# Patient Record
Sex: FEMALE | Race: WHITE | Employment: OTHER | URBAN - METROPOLITAN AREA
[De-identification: names, ages, dates, MRNs, and addresses within clinical notes are randomized per-mention and may not be internally consistent; named-entity substitution may affect disease eponyms.]

---

## 2019-02-23 ENCOUNTER — HOSPITAL ENCOUNTER (INPATIENT)
Age: 44
LOS: 1 days | Discharge: HOME | DRG: 898 | End: 2019-02-24
Attending: PSYCHIATRY & NEUROLOGY | Admitting: PSYCHIATRY & NEUROLOGY
Payer: OTHER GOVERNMENT

## 2019-02-23 PROCEDURE — 65220000003 HC RM SEMIPRIVATE PSYCH

## 2019-02-23 RX ORDER — HALOPERIDOL 5 MG/ML
5 INJECTION INTRAMUSCULAR
Status: DISCONTINUED | OUTPATIENT
Start: 2019-02-23 | End: 2019-02-24 | Stop reason: HOSPADM

## 2019-02-23 RX ORDER — LOSARTAN POTASSIUM 50 MG/1
50 TABLET ORAL DAILY
Status: DISCONTINUED | OUTPATIENT
Start: 2019-02-24 | End: 2019-02-24 | Stop reason: HOSPADM

## 2019-02-23 RX ORDER — LANOLIN ALCOHOL/MO/W.PET/CERES
100 CREAM (GRAM) TOPICAL DAILY
Status: DISCONTINUED | OUTPATIENT
Start: 2019-02-24 | End: 2019-02-24 | Stop reason: HOSPADM

## 2019-02-23 RX ORDER — FOLIC ACID 1 MG/1
1 TABLET ORAL DAILY
Status: DISCONTINUED | OUTPATIENT
Start: 2019-02-24 | End: 2019-02-24 | Stop reason: HOSPADM

## 2019-02-23 RX ORDER — HALOPERIDOL 5 MG/1
5 TABLET ORAL
Status: DISCONTINUED | OUTPATIENT
Start: 2019-02-23 | End: 2019-02-24 | Stop reason: HOSPADM

## 2019-02-23 RX ORDER — PANTOPRAZOLE SODIUM 40 MG/1
40 TABLET, DELAYED RELEASE ORAL
Status: DISCONTINUED | OUTPATIENT
Start: 2019-02-24 | End: 2019-02-24 | Stop reason: HOSPADM

## 2019-02-23 RX ORDER — LORAZEPAM 1 MG/1
1 TABLET ORAL
Status: DISCONTINUED | OUTPATIENT
Start: 2019-02-23 | End: 2019-02-24 | Stop reason: HOSPADM

## 2019-02-23 RX ORDER — HYDROXYZINE PAMOATE 50 MG/1
50 CAPSULE ORAL
Status: DISCONTINUED | OUTPATIENT
Start: 2019-02-23 | End: 2019-02-24 | Stop reason: HOSPADM

## 2019-02-23 RX ORDER — LORAZEPAM 1 MG/1
2 TABLET ORAL
Status: DISCONTINUED | OUTPATIENT
Start: 2019-02-23 | End: 2019-02-24 | Stop reason: HOSPADM

## 2019-02-23 RX ORDER — TRAZODONE HYDROCHLORIDE 50 MG/1
50 TABLET ORAL
Status: DISCONTINUED | OUTPATIENT
Start: 2019-02-23 | End: 2019-02-24 | Stop reason: HOSPADM

## 2019-02-23 RX ORDER — ACETAMINOPHEN 325 MG/1
650 TABLET ORAL
Status: DISCONTINUED | OUTPATIENT
Start: 2019-02-23 | End: 2019-02-24 | Stop reason: HOSPADM

## 2019-02-24 VITALS
SYSTOLIC BLOOD PRESSURE: 151 MMHG | TEMPERATURE: 97.8 F | DIASTOLIC BLOOD PRESSURE: 97 MMHG | HEART RATE: 92 BPM | RESPIRATION RATE: 20 BRPM

## 2019-02-24 PROBLEM — F32.A DEPRESSION: Status: ACTIVE | Noted: 2019-02-24

## 2019-02-24 PROBLEM — F10.20 ALCOHOL USE DISORDER, SEVERE, DEPENDENCE (HCC): Chronic | Status: ACTIVE | Noted: 2019-02-24

## 2019-02-24 PROCEDURE — 74011250637 HC RX REV CODE- 250/637: Performed by: PSYCHIATRY & NEUROLOGY

## 2019-02-24 RX ADMIN — PANTOPRAZOLE SODIUM 40 MG: 40 TABLET, DELAYED RELEASE ORAL at 06:39

## 2019-02-24 RX ADMIN — FOLIC ACID 1 MG: 1 TABLET ORAL at 08:45

## 2019-02-24 RX ADMIN — LORAZEPAM 1 MG: 1 TABLET ORAL at 00:12

## 2019-02-24 RX ADMIN — LORAZEPAM 1 MG: 1 TABLET ORAL at 06:22

## 2019-02-24 RX ADMIN — Medication 100 MG: at 08:45

## 2019-02-24 RX ADMIN — LORAZEPAM 1 MG: 1 TABLET ORAL at 09:22

## 2019-02-24 RX ADMIN — LOSARTAN POTASSIUM 50 MG: 50 TABLET ORAL at 08:45

## 2019-02-24 RX ADMIN — Medication 2 CAPSULE: at 06:39

## 2019-02-24 NOTE — DISCHARGE INSTRUCTIONS
BEHAVIORAL HEALTH NURSING DISCHARGE NOTE      The following personal items collected during your admission are returned to you:   Dental Appliance: Dental Appliances: None  Vision: Visual Aid: None  Hearing Aid:    Jewelry: Jewelry: None  Clothing: Clothing: Footwear, Pants, Shirt, Socks, Sweater  Other Valuables: Other Valuables: Other (comment)(make up bag in locker behind nurse station)  Valuables sent to safe: Personal Items Sent to Safe: (16 credit and debit cards)      PATIENT INSTRUCTIONS:    Your health and safety is very important to us; we remind you to commit to safety and recovery. Should you have any thoughts of harming yourself or others please dial 911, utilize your support systems, the coping strategies you have learned as well as the [facility] at [phone] or visit their website at https://suicidepreventionlifeline.org/    The following are some additional coping strategies that you can utilize if you start to feel anxious, angry, stressed or depressed    1. Exercise (running, walking, etc.). 2. Write (poetry, stories, journal). 3. Be with other people. 4. Watch a favorite TV show. 5. Practice Mindfulness  6. Watch a funny/favorite movie  7. Do some deep breathing  8. Take a hot shower or relaxing bath. 9. Play with a pet. 10. Help others  11. Read a good book. 12. Listen to music. 13. Try some aromatherapy (candle, lotion, room spray). 14. Meditate. 15. Paint or draw. 16. Rip paper into itty-bitty pieces. 17. Shoot hoops, kick a ball. 18. Hug a pillow or stuffed animal.  19. Dance. 20. Take up a new hobby. 21. [place]. 25. Make a list of blessings in your life. 23. Contact a hotline/ your therapist.  24. Talk to someone close to you. 25. Yoga. 32. Jo Ann Ramirez a friend or family member. 32. Make a list of goals for the week/month/year/5 years. The discharge information has been reviewed with the patient.   The patient verbalized understanding.       Patient armband removed and shredded      ***IMPORTANT NUMBERS***        [address]        [phone] [address]       [phone]    Suicide Prevention     [phone]

## 2019-02-24 NOTE — BH NOTES
GROUP THERAPY PROGRESS NOTE Swetha Spence is participating in [place]. Group time: 20 minutes Personal goal for participation: Positive Behavior Goal orientation: Making Positive Goals Group therapy participation: passive Therapeutic interventions reviewed and discussed: Pt encouraged to take part in treatment and to participate in activites and verbalize any issues with doctor or staff. Impression of participation: Pt sat in group but was quiet and voiced no concerns.

## 2019-02-24 NOTE — BH NOTES
0012 patient received ativan 1 mg PO for CIWA score of 5. Patient polite and appropriate with staff. 
0176 CIWA score 6. Patient received ativan 1 mg PO.

## 2019-02-25 NOTE — DISCHARGE SUMMARY
[address] SUMMARY    Name:  Ang Mendez  MR#:   [id number]  :  1975  ACCOUNT #:  [de-identified]  ADMIT DATE:  2019  DISCHARGE DATE:  2019    SHORT-STAY SUMMARY    IDENTIFYING DATA:  The patient is a 19-year-old  white female, resident of [place], [place] who lives with her  and three children. The older step child is out of the house. She is covered by [employer]. BASIS FOR ADMISSION:  The patient was admitted in referral to us from [facility] where the patient had presented in an intoxicated condition. The doctor's note shows that she had presented and supposedly was saying that she wanted inpatient psychiatric treatment. Laboratory testing done in the emergency room showed her to have a normal CBC, normal urinalysis other than a small amount of protein 100 mg/dL and moderate blood in the urine. The chemistry electrolyte panel showed slight elevation of AST 64 U/L with a slight decrease in calcium of 8.2 mg/dL and the remainder normal.  Urine hCG was negative. The alcohol level showed her to be grossly intoxicated at 289 mg/dL on the alcohol level. Urine drug screen was negative. A chest x-ray had been done because of her complaint of chest discomfort, which was normal.  An x-ray was done of the forearm, which was again normal.  A 12-lead EKG showed sinus tachycardia with nonspecific T-wave abnormality, which was improved in the anterolateral leads compared to a prior EKG. Physical examination had been done and was normal with her being described as well developed and well nourished. She did have a small tender bump over the right frontal region of the hairline without an overlying skin change or deformity. There are no other abnormalities other than this. She had no limitation in range of motion of the neck.   She had some right upper extremity tenderness to the distal radial and mid ulna without overlying skin change, deformity or crepitus with full active range of motion of wrist and elbow and as noted above, the x-ray was normal.  Neurologic examination was normal and she was answering questions appropriately with normal stance and gait moving all extremities well, though tearful and anxious. The doctor's note says that she was asking for inpatient care. When the patient was interviewed, she says that she saw the mental health screener who told her that they would give her information regarding both inpatient and outpatient treatment and she did not know that she was being sent specifically for inpatient detoxification to this facility. The patient describes a history of alcoholism which started after her gastric bypass. She denied any preexisting treatment. She had been treated several years ago at [facility] with 3 days of inpatient detoxification and then a 30-day day program.  She had been seeing a provider at [facility]. She says that she was being prescribed naltrexone 50 mg at night as needed if she felt that she was going to relapse, as well as Campral 333 mg t.i.d. She denied psychiatric medicines though there was noted that at one point in the past she had been on trazodone at night for sleep. She was being prescribed gabapentin up to 2000 mg a day for severe essential tremor. She was now denying any withdrawal symptoms. She denied desire to be in an inpatient facility and felt she did not need this. She wanted to be attending a day treatment program.  She was denying hallucinatory or delusional material.  She denied homicidal or suicidal ideas. PAST MEDICAL HISTORY:  Medical history is significant for gastric bypass having been up to a high of 270 pounds and then dropping down to 160 pounds currently. She has had low B12 level since this time and does get monthly B12 shots.   She has a history of peripartum cardiomyopathy. She has hypertension and the essential tremor. She has had bronchitis recently and said that she had back pain at that time in which she came to the hospital, but it seems to be related to her bronchitis. SUBSTANCE ABUSE:  Alcohol history is as above. She says she has had concerns of a relapse at times and will take the naltrexone as needed. She denied drug abuse. She was smoking 15 cigarettes a day and did want to take Chantix to stop this when she got out of the hospital.    FAMILY AND SOCIAL HISTORY:  She denied a family history of psychiatric illness or drug or alcohol abuse. She had previously been in the [employer]. She and her  had child-rearing responsibilities for his three older children, now 21,19, and 16 with the children's mother having left the area. She also has an 6year-old child. She does work as a . MENTAL STATUS EXAM:  Revealed her to be an alert, oriented white female with an essential tremor. Eye contact was fair. Speech was fluent. Mood was unhappy with a congruent affect. Thought processing was logical and goal directed. She was denying hallucinatory or delusional material.  Memory and cognition were intact. Insight and judgment were influenced by her substance use and unhappiness with her current situation. She was minimizing or discounting all symptoms of withdrawal.    HOSPITAL COURSE:  The patient was admitted to the [facility] where she was afforded the individual, group, and milieu therapies. While in the hospital, she had received dosing of 2 capsules of Raffy-Recover capsules, folic acid 1 mg, lorazepam 1 mg on three occasions for anxiety, losartan 50 mg, Protonix 40 mg, and thiamine 100 mg. As noted, she was saying she did not wish to be in the hospital and was not feeling that she had withdrawal symptoms.   She felt that she needed to be discharged so that she could seek out her treatment that she really wanted, which [facility].  She did not wish to stay in the hospital for an additional day for our social workers to work on this tomorrow to get her there. She did not meet criteria to hold her against medical advice. It did not appear medically indicated to force her to stay for medical treatment. ASSESSMENT  AXIS I:  1. Alcohol use disorder, severe alcohol intoxication, resolved. 2.  Nicotine use disorder, moderate. 3.  Alcohol-induced mood disorder. AXIS II:  None. AXIS III:  1. Essential tremor. 2.  Cardiomyopathy, improved. 3.  Status post gastric bypass. 4.  Vitamin B12 deficiency. 5.  Hypertension. 6.  Recent bronchitis. CONDITION ON DISCHARGE:  Fair. PROGNOSIS:  Fair. DISPOSITION:  Discharged to self. The patient [facility] to try to get into their day program for alcohol treatment. The patient will follow up with [facility] program to see her therapist and the provider there. DISCHARGE MEDICATIONS:  1.  Gabapentin 400 mg q.i.d.  2.  Naltrexone 50 mg at bedtime as needed to maintain sobriety. 3.  Campral 333 mg three times a day. 4.  Vitamin B12 injections monthly. The patient does not require prescriptions as she has medications at home.       Krishna Tello MD      [id number]  D:  02/24/2019 12:22  T:  02/24/2019 19:34  JOB #:  [id number]

## 2019-08-10 PROBLEM — R07.9 CHEST PAIN: Status: ACTIVE | Noted: 2019-08-10

## 2019-08-10 PROBLEM — N12 PYELONEPHRITIS: Status: ACTIVE | Noted: 2019-08-10

## 2019-11-14 PROBLEM — T50.901A OVERDOSE: Status: ACTIVE | Noted: 2019-11-14

## 2022-02-02 DIAGNOSIS — K91.2 POST-RESECTION MALABSORPTION: Primary | ICD-10-CM

## 2022-02-09 ENCOUNTER — HOSPITAL ENCOUNTER (OUTPATIENT)
Dept: LAB | Age: 47
Discharge: HOME | End: 2022-02-09
Payer: OTHER GOVERNMENT

## 2022-02-09 DIAGNOSIS — K91.2 POST-RESECTION MALABSORPTION: ICD-10-CM

## 2022-02-09 LAB
25(OH)D3 SERPL-MCNC: 23.8 NG/ML (ref 30–100)
ALBUMIN SERPL-MCNC: 3.7 G/DL (ref 3.4–5)
ALBUMIN/GLOB SERPL: 1.3 {RATIO} (ref 0.8–1.7)
ALP SERPL-CCNC: 71 U/L (ref 45–117)
ALT SERPL-CCNC: 29 U/L (ref 13–56)
ANION GAP SERPL CALC-SCNC: 5 MMOL/L (ref 3–18)
AST SERPL-CCNC: 18 U/L (ref 10–38)
BASOPHILS # BLD: 0.1 K/UL (ref 0–0.1)
BASOPHILS NFR BLD: 1 % (ref 0–2)
BILIRUB SERPL-MCNC: 0.4 MG/DL (ref 0.2–1)
BUN SERPL-MCNC: 9 MG/DL (ref 7–18)
BUN/CREAT SERPL: 16 (ref 12–20)
CALCIUM SERPL-MCNC: 8.9 MG/DL (ref 8.5–10.1)
CHLORIDE SERPL-SCNC: 109 MMOL/L (ref 100–111)
CO2 SERPL-SCNC: 27 MMOL/L (ref 21–32)
CREAT SERPL-MCNC: 0.56 MG/DL (ref 0.6–1.3)
DIFFERENTIAL METHOD BLD: ABNORMAL
EOSINOPHIL # BLD: 0.1 K/UL (ref 0–0.4)
EOSINOPHIL NFR BLD: 1 % (ref 0–5)
ERYTHROCYTE [DISTWIDTH] IN BLOOD BY AUTOMATED COUNT: 13.9 % (ref 11.6–14.5)
FERRITIN SERPL-MCNC: 20 NG/ML (ref 8–388)
FOLATE SERPL-MCNC: 11.6 NG/ML (ref 3.1–17.5)
GLOBULIN SER CALC-MCNC: 2.9 G/DL (ref 2–4)
GLUCOSE SERPL-MCNC: 61 MG/DL (ref 74–99)
HCT VFR BLD AUTO: 40 % (ref 35–45)
HGB BLD-MCNC: 13.1 G/DL (ref 12–16)
IMM GRANULOCYTES # BLD AUTO: 0.2 K/UL (ref 0–0.04)
IMM GRANULOCYTES NFR BLD AUTO: 2 % (ref 0–0.5)
IRON SERPL-MCNC: 52 UG/DL (ref 50–175)
LYMPHOCYTES # BLD: 2.9 K/UL (ref 0.9–3.6)
LYMPHOCYTES NFR BLD: 30 % (ref 21–52)
MCH RBC QN AUTO: 31.6 PG (ref 24–34)
MCHC RBC AUTO-ENTMCNC: 32.8 G/DL (ref 31–37)
MCV RBC AUTO: 96.4 FL (ref 78–100)
MONOCYTES # BLD: 0.7 K/UL (ref 0.05–1.2)
MONOCYTES NFR BLD: 7 % (ref 3–10)
NEUTS SEG # BLD: 5.8 K/UL (ref 1.8–8)
NEUTS SEG NFR BLD: 60 % (ref 40–73)
NRBC # BLD: 0 K/UL (ref 0–0.01)
NRBC BLD-RTO: 0 PER 100 WBC
PLATELET # BLD AUTO: 401 K/UL (ref 135–420)
PMV BLD AUTO: 9.6 FL (ref 9.2–11.8)
POTASSIUM SERPL-SCNC: 4.1 MMOL/L (ref 3.5–5.5)
PROT SERPL-MCNC: 6.6 G/DL (ref 6.4–8.2)
RBC # BLD AUTO: 4.15 M/UL (ref 4.2–5.3)
SODIUM SERPL-SCNC: 141 MMOL/L (ref 136–145)
VIT B12 SERPL-MCNC: 570 PG/ML (ref 211–911)
WBC # BLD AUTO: 9.7 K/UL (ref 4.6–13.2)

## 2022-02-09 PROCEDURE — 84425 ASSAY OF VITAMIN B-1: CPT

## 2022-02-09 PROCEDURE — 82607 VITAMIN B-12: CPT

## 2022-02-09 PROCEDURE — 36415 COLL VENOUS BLD VENIPUNCTURE: CPT

## 2022-02-09 PROCEDURE — 85025 COMPLETE CBC W/AUTO DIFF WBC: CPT

## 2022-02-09 PROCEDURE — 82306 VITAMIN D 25 HYDROXY: CPT

## 2022-02-09 PROCEDURE — 80053 COMPREHEN METABOLIC PANEL: CPT

## 2022-02-09 PROCEDURE — 83540 ASSAY OF IRON: CPT

## 2022-02-09 PROCEDURE — 82728 ASSAY OF FERRITIN: CPT

## 2022-02-15 ENCOUNTER — OFFICE VISIT (OUTPATIENT)
Dept: SURGERY | Age: 47
End: 2022-02-15
Payer: OTHER GOVERNMENT

## 2022-02-15 VITALS
DIASTOLIC BLOOD PRESSURE: 85 MMHG | WEIGHT: 125 LBS | BODY MASS INDEX: 19.62 KG/M2 | HEIGHT: 67 IN | OXYGEN SATURATION: 100 % | TEMPERATURE: 96.8 F | HEART RATE: 65 BPM | SYSTOLIC BLOOD PRESSURE: 135 MMHG

## 2022-02-15 DIAGNOSIS — Z98.84 S/P GASTRIC BYPASS: ICD-10-CM

## 2022-02-15 DIAGNOSIS — K91.2 POST-RESECTION MALABSORPTION: Primary | ICD-10-CM

## 2022-02-15 DIAGNOSIS — R53.82 CHRONIC FATIGUE: ICD-10-CM

## 2022-02-15 DIAGNOSIS — R63.4 WEIGHT LOSS: ICD-10-CM

## 2022-02-15 DIAGNOSIS — R10.13 EPIGASTRIC PAIN: ICD-10-CM

## 2022-02-15 DIAGNOSIS — Z86.39 HX OF OBESITY: ICD-10-CM

## 2022-02-15 PROCEDURE — 99203 OFFICE O/P NEW LOW 30 MIN: CPT | Performed by: NURSE PRACTITIONER

## 2022-02-15 RX ORDER — PANTOPRAZOLE SODIUM 40 MG/1
TABLET, DELAYED RELEASE ORAL
COMMUNITY

## 2022-02-15 RX ORDER — DEXTROAMPHETAMINE SACCHARATE, AMPHETAMINE ASPARTATE, DEXTROAMPHETAMINE SULFATE AND AMPHETAMINE SULFATE 5; 5; 5; 5 MG/1; MG/1; MG/1; MG/1
20 TABLET ORAL
COMMUNITY

## 2022-02-15 NOTE — LETTER
2/17/2022    Patient: Marcella Agarwal   YOB: 1975   Date of Visit: 2/15/2022     Sailaja Emanuel NP  [address]  [address] [facility] [address]  Via Fax: [phone]    Dear Sailaja Emanuel NP,      Thank you for referring Ms. Yasmin Peña to [place] [address] for evaluation. My notes for this consultation are attached. If you have questions, please do not hesitate to call me. I look forward to following your patient along with you.       Sincerely,    Abraham Funez NP

## 2022-02-15 NOTE — PROGRESS NOTES
Bariatric Postoperative Progress Note    CC: Annual Bariatric Follow Up    Wendy Herrera is a 55 y.o. female now 12 years status post laparoscopic gastric bypass surgery performed in 2009 with Dr. Sophy Mazariegos. Currently eating chicken, seafood, beef, pork, turkey, broccoli, asparagus, brussels, apples, berries, oranges, bread, pasta, rice, potatoes, cookie, donuts, occ soda. Taking in 64 oz water,  120 g protein. 0 min of activity 7 days a week. Bowel movements are regular. She is compliant with multivitamins, calcium, Vit D and B12 supplements. Pre op weight: 275  Weight yamilka 145 in 2010, maintained 165-175 until March of last year. Had a lot of life changes, stopped ETOH, new job and went down to 145 in August.   Around that same time, started experiencing abd pain in epigastric area that will radiate to left shoulder and down arm as well as across to right side of back. She reports this pain is continuous but worsens with eating. Also experiencing fatigue and SOB, with further weight loss and is now 125 lbs. Pt reports this pain is similar in nature to when she had ulcer shortly after bypass, currently taking PPI and Carafate. PCP started workup due to rapid weight loss and fatigue. Pt had echo (hx cardiomyopathy), CT, and lab workup with recommendation to follow up with bariatrics. Currently smoking 1/2 ppd, sober from ETOH for 1 year, denies NSAIDs.      Weight Loss Metrics 2/15/2022 11/15/2019 8/10/2019 2/23/2019 1/16/2019 10/9/2017 3/8/2016   Pre op / Initial Wt - - - - - - -   Today's Wt 125 lb 152 lb 1.9 oz 156 lb 160 lb 0.9 oz 160 lb 175 lb 170 lb   BMI 19.58 kg/m2 21.22 kg/m2 25.18 kg/m2 25.07 kg/m2 25.06 kg/m2 28.25 kg/m2 27.45 kg/m2   Ideal Body Wt - - - - - - -   Excess Body Wt - - - - - - -   Goal Wt - - - - - - -   Wt loss to date - - - - - - -   % Wt Loss - - - - - - -   80% EBW - - - - - - -       Past Medical History:   Diagnosis Date    Anxiety     Arthropathy     weight-related    Essential tremor     GERD (gastroesophageal reflux disease)     Heart failure     Hepatic steatosis     Hypertension     Obesity, morbid ([address] Jan 2010    Clinically severe obesity with BMI of 46       Past Surgical History:   Procedure Laterality Date    HX BREAST AUGMENTATION  2010    HX GASTRIC BYPASS  1/18/2010    lap Lynette-en-Y    HX LIPECTOMY  2010       Current Outpatient Medications   Medication Sig Dispense Refill    pantoprazole (PROTONIX) 40 mg tablet pantoprazole 40 mg tablet,delayed release      dextroamphetamine-amphetamine (AdderalL) 20 mg tablet Take 20 mg by mouth.  albuterol (PROAIR HFA) 90 mcg/actuation inhaler Take 2 Puffs by inhalation every four (4) hours as needed for Wheezing.  fluticasone propionate (FLONASE) 50 mcg/actuation nasal spray 2 Sprays by Both Nostrils route daily.  ergocalciferol (ERGOCALCIFEROL) 50,000 unit capsule Take 1 Cap by mouth every seven (7) days. 12 Cap 3    propranolol (INDERAL) 20 mg tablet Take 1 tablet by mouth as needed 2 to 3 times a day.  cyanocobalamin (VITAMIN B12) 1,000 mcg/mL injection 1,000 mcg by IntraMUSCular route every month.  CARAFATE 100 mg/mL suspension Take 10 mL by mouth four (4) times daily. (Patient taking differently: Take 1 g by mouth four (4) times daily as needed. Indications: ulcer of the duodenum) 1200 mL 1    MULTIVITAMIN PO Take 1 Tab by mouth daily. Indications: lack in vitamins         Allergies   Allergen Reactions    Lisinopril Cough    Nsaids (Non-Steroidal Anti-Inflammatory Drug) Other (comments)     Had gastric bypass       ROS:  Review of Systems   Constitutional: Positive for malaise/fatigue and weight loss. Eyes: Negative. Respiratory: Positive for shortness of breath. Negative for cough. Cardiovascular: Positive for palpitations. Gastrointestinal: Positive for abdominal pain. Negative for constipation, diarrhea, heartburn, nausea and vomiting. Genitourinary: Negative.     Skin: Negative. Neurological: Positive for dizziness. Physicial Exam:  Visit Vitals  /85 (BP 1 Location: Right arm, BP Patient Position: Sitting)   Pulse 65   Temp 96.8 °F (36 °C)   Ht 5' 7\" (1.702 m)   Wt 56.7 kg (125 lb)   SpO2 100%   BMI 19.58 kg/m²     Physical Exam  Vitals and nursing note reviewed. Constitutional:       Appearance: Normal appearance. HENT:      Head: Normocephalic and atraumatic. Cardiovascular:      Rate and Rhythm: Normal rate and regular rhythm. Heart sounds: Normal heart sounds. Pulmonary:      Effort: Pulmonary effort is normal.   Abdominal:      General: Bowel sounds are normal. There is no distension. Palpations: Abdomen is soft. There is no mass. Tenderness: There is abdominal tenderness. Hernia: No hernia is present. Musculoskeletal:         General: Normal range of motion. Skin:     General: Skin is warm and dry. Neurological:      General: No focal deficit present. Mental Status: She is alert and oriented to person, place, and time. Psychiatric:         Mood and Affect: Mood normal.         Behavior: Behavior normal.         Labs:   Recent Results (from the past 672 hour(s))   CBC WITH AUTOMATED DIFF    Collection Time: 02/09/22 12:44 PM   Result Value Ref Range    WBC 9.7 4.6 - 13.2 K/uL    RBC 4.15 (L) 4.20 - 5.30 M/uL    HGB 13.1 12.0 - 16.0 g/dL    HCT 40.0 35.0 - 45.0 %    MCV 96.4 78.0 - 100.0 FL    MCH 31.6 24.0 - 34.0 PG    MCHC 32.8 31.0 - 37.0 g/dL    RDW 13.9 11.6 - 14.5 %    PLATELET 341 052 - 744 K/uL    MPV 9.6 9.2 - 11.8 FL    NRBC 0.0 0  WBC    ABSOLUTE NRBC 0.00 0.00 - 0.01 K/uL    NEUTROPHILS 60 40 - 73 %    LYMPHOCYTES 30 21 - 52 %    MONOCYTES 7 3 - 10 %    EOSINOPHILS 1 0 - 5 %    BASOPHILS 1 0 - 2 %    IMMATURE GRANULOCYTES 2 (H) 0.0 - 0.5 %    ABS. NEUTROPHILS 5.8 1.8 - 8.0 K/UL    ABS. LYMPHOCYTES 2.9 0.9 - 3.6 K/UL    ABS. MONOCYTES 0.7 0.05 - 1.2 K/UL    ABS. EOSINOPHILS 0.1 0.0 - 0.4 K/UL    ABS. BASOPHILS 0.1 0.0 - 0.1 K/UL    ABS. IMM. GRANS. 0.2 (H) 0.00 - 0.04 K/UL    DF AUTOMATED     FERRITIN    Collection Time: 02/09/22 12:44 PM   Result Value Ref Range    Ferritin 20 8 - 388 NG/ML   IRON    Collection Time: 02/09/22 12:44 PM   Result Value Ref Range    Iron 52 50 - 081 ug/dL   METABOLIC PANEL, COMPREHENSIVE    Collection Time: 02/09/22 12:44 PM   Result Value Ref Range    Sodium 141 136 - 145 mmol/L    Potassium 4.1 3.5 - 5.5 mmol/L    Chloride 109 100 - 111 mmol/L    CO2 27 21 - 32 mmol/L    Anion gap 5 3.0 - 18 mmol/L    Glucose 61 (L) 74 - 99 mg/dL    BUN 9 7.0 - 18 MG/DL    Creatinine 0.56 (L) 0.6 - 1.3 MG/DL    BUN/Creatinine ratio 16 12 - 20      GFR est AA >60 >60 ml/min/1.73m2    GFR est non-AA >60 >60 ml/min/1.73m2    Calcium 8.9 8.5 - 10.1 MG/DL    Bilirubin, total 0.4 0.2 - 1.0 MG/DL    ALT (SGPT) 29 13 - 56 U/L    AST (SGOT) 18 10 - 38 U/L    Alk. phosphatase 71 45 - 117 U/L    Protein, total 6.6 6.4 - 8.2 g/dL    Albumin 3.7 3.4 - 5.0 g/dL    Globulin 2.9 2.0 - 4.0 g/dL    A-G Ratio 1.3 0.8 - 1.7     VITAMIN B12 & FOLATE    Collection Time: 02/09/22 12:44 PM   Result Value Ref Range    Vitamin B12 570 211 - 911 pg/mL    Folate 11.6 3.10 - 17.50 ng/mL   VITAMIN D, 25 HYDROXY    Collection Time: 02/09/22 12:44 PM   Result Value Ref Range    Vitamin D 25-Hydroxy 23.8 (L) 30 - 100 ng/mL     CT CHEST W CONT 1/21/2022  IMPRESSION:   1. No lymphadenopathy in the chest, abdomen or pelvis. 2. Small bowel intussusception. No bowel obstruction. 3. Fibroid uterus.     CT ABD PELV W CONT 1/21/2022  1. No lymphadenopathy in the chest, abdomen or pelvis. 2. Small bowel intussusception. No bowel obstruction. 3. Fibroid uterus. Assessment/Plan:   She is currently 12 years s/p laparoscopic gastric bypass surgery with a total weight loss of 150 lbs to date, struggling with rapid weight loss, abd pain, fatigue, and SOB.   Labs were reviewed and pt was instructed to continue MVI/B1/B12/D supplementation. PCP is addressing vitamin D insufficiency. Concerning that CT showed SB intussusception. Will order UGI with SB follow through for further eval.  If no other abnormalities, will refer to GI. She already has referral through PCP for colonoscopy, recommended EGD as well if UGI normal.    Continue with PPI and prn Carafate due to hx of ulcer and current tobacco use. We have reviewed the components of a successful postoperative course including requirement for a high protein, low carbohydrate diet, 64 oz a day of zero calorie liquids, daily vitamin supplementation, daily exercise (150 mis/week), regular follow-up, and participation in support groups. Refer to registered dietitian for more detailed medical nutrition therapy as needed. The primary encounter diagnosis was Post-resection malabsorption. Diagnoses of S/P gastric bypass, Hx of obesity, Body mass index (BMI) of 19.0 to 19.9 in adult, Weight loss, and Chronic fatigue were also pertinent to this visit.      Brenda Maldonado NP

## 2022-02-21 LAB — VIT B1 BLD-SCNC: 167.2 NMOL/L (ref 66.5–200)

## 2022-03-02 ENCOUNTER — HOSPITAL ENCOUNTER (OUTPATIENT)
Dept: GENERAL RADIOLOGY | Age: 47
Discharge: HOME | End: 2022-03-02
Attending: NURSE PRACTITIONER
Payer: OTHER GOVERNMENT

## 2022-03-02 DIAGNOSIS — K91.2 POST-RESECTION MALABSORPTION: ICD-10-CM

## 2022-03-02 DIAGNOSIS — Z98.84 S/P GASTRIC BYPASS: ICD-10-CM

## 2022-03-02 DIAGNOSIS — R10.13 EPIGASTRIC PAIN: ICD-10-CM

## 2022-03-02 DIAGNOSIS — Z86.39 HX OF OBESITY: ICD-10-CM

## 2022-03-02 DIAGNOSIS — R63.4 WEIGHT LOSS: ICD-10-CM

## 2022-03-02 DIAGNOSIS — R53.82 CHRONIC FATIGUE: ICD-10-CM

## 2022-03-02 PROCEDURE — 74248 X-RAY SM INT F-THRU STD: CPT

## 2022-03-02 PROCEDURE — 74011000250 HC RX REV CODE- 250: Performed by: NURSE PRACTITIONER

## 2022-03-02 RX ADMIN — BARIUM SULFATE 352 G: 960 POWDER, FOR SUSPENSION ORAL at 09:34
